# Patient Record
Sex: FEMALE | Race: BLACK OR AFRICAN AMERICAN | Employment: FULL TIME | URBAN - METROPOLITAN AREA
[De-identification: names, ages, dates, MRNs, and addresses within clinical notes are randomized per-mention and may not be internally consistent; named-entity substitution may affect disease eponyms.]

---

## 2018-08-15 ENCOUNTER — HOSPITAL ENCOUNTER (EMERGENCY)
Facility: CLINIC | Age: 38
Discharge: HOME | End: 2018-08-15
Attending: EMERGENCY MEDICINE | Admitting: EMERGENCY MEDICINE
Payer: COMMERCIAL

## 2018-08-15 ENCOUNTER — APPOINTMENT (OUTPATIENT)
Dept: ULTRASOUND IMAGING | Facility: CLINIC | Age: 38
End: 2018-08-15
Attending: EMERGENCY MEDICINE
Payer: COMMERCIAL

## 2018-08-15 VITALS
OXYGEN SATURATION: 98 % | RESPIRATION RATE: 16 BRPM | BODY MASS INDEX: 40.48 KG/M2 | DIASTOLIC BLOOD PRESSURE: 98 MMHG | HEART RATE: 85 BPM | WEIGHT: 220 LBS | SYSTOLIC BLOOD PRESSURE: 154 MMHG | HEIGHT: 62 IN | TEMPERATURE: 98 F

## 2018-08-15 DIAGNOSIS — N93.8 DYSFUNCTIONAL UTERINE BLEEDING: ICD-10-CM

## 2018-08-15 LAB
ABO + RH BLD: NORMAL
ABO + RH BLD: NORMAL
B-HCG SERPL-ACNC: <1 IU/L (ref 0–5)
BASOPHILS # BLD AUTO: 0 10E9/L (ref 0–0.2)
BASOPHILS NFR BLD AUTO: 0.4 %
BLOOD BANK CMNT PATIENT-IMP: NORMAL
BLOOD BANK CMNT PATIENT-IMP: NORMAL
DIFFERENTIAL METHOD BLD: NORMAL
EOSINOPHIL # BLD AUTO: 0.1 10E9/L (ref 0–0.7)
EOSINOPHIL NFR BLD AUTO: 1.4 %
ERYTHROCYTE [DISTWIDTH] IN BLOOD BY AUTOMATED COUNT: 12.3 % (ref 10–15)
FETAL CELL SCN BLD QL ROSETTE: NORMAL
HCT VFR BLD AUTO: 42.6 % (ref 35–47)
HGB BLD-MCNC: 14 G/DL (ref 11.7–15.7)
IMM GRANULOCYTES # BLD: 0 10E9/L (ref 0–0.4)
IMM GRANULOCYTES NFR BLD: 0.3 %
LYMPHOCYTES # BLD AUTO: 2.8 10E9/L (ref 0.8–5.3)
LYMPHOCYTES NFR BLD AUTO: 29.8 %
MCH RBC QN AUTO: 30.1 PG (ref 26.5–33)
MCHC RBC AUTO-ENTMCNC: 32.9 G/DL (ref 31.5–36.5)
MCV RBC AUTO: 92 FL (ref 78–100)
MONOCYTES # BLD AUTO: 0.6 10E9/L (ref 0–1.3)
MONOCYTES NFR BLD AUTO: 6 %
NEUTROPHILS # BLD AUTO: 5.9 10E9/L (ref 1.6–8.3)
NEUTROPHILS NFR BLD AUTO: 62.1 %
NRBC # BLD AUTO: 0 10*3/UL
NRBC BLD AUTO-RTO: 0 /100
PLATELET # BLD AUTO: 341 10E9/L (ref 150–450)
RBC # BLD AUTO: 4.65 10E12/L (ref 3.8–5.2)
RH IG VIALS RECOM PATIENT: NORMAL
SPECIMEN SOURCE: NORMAL
WBC # BLD AUTO: 9.5 10E9/L (ref 4–11)
WET PREP SPEC: NORMAL

## 2018-08-15 PROCEDURE — 87210 SMEAR WET MOUNT SALINE/INK: CPT | Performed by: EMERGENCY MEDICINE

## 2018-08-15 PROCEDURE — 76856 US EXAM PELVIC COMPLETE: CPT

## 2018-08-15 PROCEDURE — 25000128 H RX IP 250 OP 636: Performed by: EMERGENCY MEDICINE

## 2018-08-15 PROCEDURE — 86901 BLOOD TYPING SEROLOGIC RH(D): CPT | Performed by: EMERGENCY MEDICINE

## 2018-08-15 PROCEDURE — 96360 HYDRATION IV INFUSION INIT: CPT

## 2018-08-15 PROCEDURE — 85461 HEMOGLOBIN FETAL: CPT | Performed by: EMERGENCY MEDICINE

## 2018-08-15 PROCEDURE — 84702 CHORIONIC GONADOTROPIN TEST: CPT | Performed by: EMERGENCY MEDICINE

## 2018-08-15 PROCEDURE — 99284 EMERGENCY DEPT VISIT MOD MDM: CPT | Mod: 25

## 2018-08-15 PROCEDURE — 86900 BLOOD TYPING SEROLOGIC ABO: CPT | Performed by: EMERGENCY MEDICINE

## 2018-08-15 PROCEDURE — 85025 COMPLETE CBC W/AUTO DIFF WBC: CPT | Performed by: EMERGENCY MEDICINE

## 2018-08-15 PROCEDURE — 96361 HYDRATE IV INFUSION ADD-ON: CPT

## 2018-08-15 RX ADMIN — SODIUM CHLORIDE 1000 ML: 9 INJECTION, SOLUTION INTRAVENOUS at 10:12

## 2018-08-15 ASSESSMENT — ENCOUNTER SYMPTOMS: ABDOMINAL PAIN: 1

## 2018-08-15 NOTE — ED AVS SNAPSHOT
[facility] Emergency Department    [address]    [facility] [address]    Phone:  [phone]    Fax:  [phone]                                       Amparo Johnson   MRN: [id number]    Department:  [facility] Emergency Department   Date of Visit:  8/15/2018           After Visit Summary Signature Page     I have received my discharge instructions, and my questions have been answered. I have discussed any challenges I see with this plan with the nurse or doctor.    ..........................................................................................................................................  Patient/Patient Representative Signature      ..........................................................................................................................................  Patient Representative Print Name and Relationship to Patient    ..................................................               ................................................  Date                                            Time    ..........................................................................................................................................  Reviewed by Signature/Title    ...................................................              ..............................................  Date                                                            Time

## 2018-08-15 NOTE — DISCHARGE INSTRUCTIONS
Dysfunctional Uterine Bleeding    Dysfunctional uterine bleeding, also called abnormal uterine bleeding, is a condition in which bleeding is abnormal and occurs at unexpected times of the month. This happens because of changes in the hormones that help control a woman s menstrual cycle each month.  The bleeding may be heavier or lighter than normal. If you have heavy bleeding often, this can lead to a problem called anemia. With anemia, your red blood cell count is too low. Red blood cells help carry oxygen throughout your body. Severe anemia may cause you to look pale and feel very weak or tired. You might also become short of breath easily.  To treat dysfunctional uterine bleeding, medicines are often tried first. If these don t help, or if you have additional symptoms or have reached menopause, further testing and treatments may be needed. Discuss all of your options with your provider.  Home care  Medicines  If you re prescribed medicines, be sure to take them as directed. Some of the more common medicines you may be prescribed include:    Hormone therapy (Options include most methods of hormonal birth control such as pills, shots, or a hormone-releasing IUD)    Nonsteroidal anti-inflammatory drugs (NSAIDs), such as ibuprofen    Iron supplements, if you have anemia     General care    Get plenty of rest if you tire easily. Avoid heavy exertion.    To help relieve pain or cramping that may occur with bleeding, try using a heating pad on the lower belly or back. A warm bath may also help.  Follow-up care  Follow up with your healthcare provider, or as directed.  When to seek medical advice  Call your healthcare provider right away if:    Bleeding becomes heavy (soaking 1 pad or tampon every hour for 3 hours)    Increased abdominal pain    Irregular bleeding worsens or does not get better even with treatment    Fever of 100.4 F (38 C) or higher, or as directed by your provider    Signs of anemia, such as pale  skin, extreme fatigue or weakness, or shortness of breath    Dizziness or fainting   Date Last Reviewed: 11/1/2017 2000-2017 [employer]. [address] [place] [address], [place] [address]. All rights reserved. This information is not intended as a substitute for professional medical care. Always follow your healthcare professional's instructions.

## 2018-08-15 NOTE — ED TRIAGE NOTES
Pt states had vaginal bleeding last night with large clots and abdominal pain last night. Pt thought maybe she was getting her period as it is irregular and last period was over a month ago. Pt states bleeding and pain subsided until this morning. Pt states bleeding became heavy again this morning. Pt ABCs intrat and A&Ox4.

## 2018-08-15 NOTE — ED PROVIDER NOTES
"  History     Chief Complaint:  Vaginal Bleeding    HPI   Amparo Johnson is a 38 year old female who presents to the emergency department today for evaluation of vaginal bleeding and abdominal pain.  The patient reports that yesterday, 08/14/18, she had abdominal pain. She initially attributed this pain to her period, but notes she did not have her period for a month. The patient then states she took a shower and \"huge\" blood clots came out an she had sharp abdominal pain. She states she was going through 2 pads per hour. She states she has a history of heavy periods, but is not currently taking medication. The patient denies any recent positive pregnancy test.     Allergies:  No Known Allergies     Medications:    Baclofen  Calcium carbonate    Past Medical History:    Cardiovascular screening  Hypertension  Multiple sclerosis    Past Surgical History:   History reviewed.  No pertinent past surgical history.     Family History:    Father: diabetes, hypetension    Social History:  The patient was accompanied to the ED by her significant other.  Smoking Status: Former Smoker   Packs/day: 1   Years:15   Type: cigarettes   Quit date: 01/17/2013  Smokeless Tobacco: Never Used  Alcohol Use: Positive   Marital Status:  Single      Review of Systems   Gastrointestinal: Positive for abdominal pain.   Genitourinary: Positive for vaginal bleeding.   All other systems reviewed and are negative.    Physical Exam     Patient Vitals for the past 24 hrs:   BP Temp Temp src Pulse Resp SpO2 Height Weight   08/15/18 1324 (!) 154/98 - - - 16 98 % - -   08/15/18 1323 - - - - - 100 % - -   08/15/18 1143 - - - - - 100 % - -   08/15/18 1100 (!) 141/94 - - - - 98 % - -   08/15/18 1045 - - - 85 - 100 % - -   08/15/18 1039 - - - - - 100 % - -   08/15/18 1038 (!) 145/97 - - - - - - -   08/15/18 0952 - 98  F (36.7  C) Oral 89 16 100 % 1.575 m (5' 2\") 99.8 kg (220 lb)   08/15/18 0948 (!) 176/105 - - - - - - -      Physical Exam "   Constitutional: She appears well-developed and well-nourished.   HENT:   Right Ear: External ear normal.   Left Ear: External ear normal.   Mouth/Throat: Oropharynx is clear and moist. No oropharyngeal exudate.   Eyes: Conjunctivae are normal. Pupils are equal, round, and reactive to light. No scleral icterus.   Neck: Normal range of motion. Neck supple.   Cardiovascular: Normal rate, regular rhythm, normal heart sounds and intact distal pulses.  Exam reveals no gallop and no friction rub.    No murmur heard.  Pulmonary/Chest: Effort normal and breath sounds normal. No respiratory distress. She has no wheezes. She has no rales.   Abdominal: Soft. Bowel sounds are normal. She exhibits no distension and no mass. There is no tenderness.   Genitourinary:   Genitourinary Comments: Some bleeding, no abnormalities. No CMT, no uterine TTP   Musculoskeletal: She exhibits no edema.   Neurological: She is alert.   Skin: Skin is warm and dry. No rash noted.   Psychiatric: She has a normal mood and affect.     Emergency Department Course     Imaging:  Radiology findings were communicated with the patient who voiced understanding of the findings.    US Pelvic Complete with Transvaginal  1. Endometrial stripe is approximately 1 cm in diameter. There is  echogenic material in the endometrial canal in the lower uterus. This  could be blood or echogenic endometrial tissue. Recommend followup  ultrasound in one to two menstrual cycles.  2. Otherwise unremarkable.  Reading per radiology    Laboratory:  Laboratory findings were communicated with the patient who voiced understanding of the findings.    CBC: WBC 9.5, HGB 14,   HCG Quantitative: <1  Rh immune globulin study: ABO A, RH(D) pos    Wet prep: AWNL    Interventions:  1012 NS bolus 1000 mL IV    Emergency Department Course:    0945 Nursing notes and vitals reviewed.    0950 I performed an exam of the patient as documented above.     1016 IV was inserted and blood was  drawn for laboratory testing, results above.     1112 The patient was sent for a Pelvic US while in the emergency department, results above.      1145 I performed a pelvic exam of the patient as documented above it the presence of a female  chaperone.      1158 A vaginal swab was obtained for laboratory testing as documented above.     1310 Recheck and update. Patient is requesting dilation and curettage     1322 I spoke with Dr. Mckenzie of the OB service from regarding patient's presentation, findings, and plan  of care.     1326 I personally reviewed the laboratory and imaging results with the patient and answered all  related questions prior to discharge.    Impression & Plan      Medical Decision Making:  Amparo Johnson is a 38 year old female who presents with possible pregnancy and vaginal bleeding that started yesterday. The patient otherwise did not have any sever pain on exam. Hemoglobin is stable at 14. She does have a history of anemia as a teenager, but not currently. HCG is less than 1, US showed a small anterior fibroid and mild thickened endometrial stripe this also echogenic area which could be endometria tissue versus blood. The patient is otherwise comfortable at this point. I did refer to Dr. Dukes's office. Case was discussed with Dr. Mckenzie, the on call physician.     Diagnosis:    ICD-10-CM    1. Dysfunctional uterine bleeding N93.8      Disposition:   The patient is discharged to home.     Discharge Medications:  No discharge medications    Scribe Disclosure:  I, Camelia Martínez, am serving as a scribe at 9:45 AM on 8/15/2018 to document services personally performed by Norm Chung MD based on my observations and the provider's statements to me.     [facility] EMERGENCY DEPARTMENT       Norm Cuhng MD  08/15/18 6713

## 2018-08-15 NOTE — ED AVS SNAPSHOT
[facility] Emergency Department    [address] [place] [address]    Phone:  [phone]    Fax:  [phone]                                       Amparo Johnson   MRN: [id number]    Department:  [facility] Emergency Department   Date of Visit:  8/15/2018           Patient Information     Date Of Birth          1980        Your diagnoses for this visit were:     Dysfunctional uterine bleeding        You were seen by Norm Chung MD.      Follow-up Information     Follow up with Linda Joseph MD. Go in 2 days.    Specialty:  OB/Gyn    Contact information:    [facility]  [address]  [facility] [address]  [phone]          Discharge Instructions         Dysfunctional Uterine Bleeding    Dysfunctional uterine bleeding, also called abnormal uterine bleeding, is a condition in which bleeding is abnormal and occurs at unexpected times of the month. This happens because of changes in the hormones that help control a woman s menstrual cycle each month.  The bleeding may be heavier or lighter than normal. If you have heavy bleeding often, this can lead to a problem called anemia. With anemia, your red blood cell count is too low. Red blood cells help carry oxygen throughout your body. Severe anemia may cause you to look pale and feel very weak or tired. You might also become short of breath easily.  To treat dysfunctional uterine bleeding, medicines are often tried first. If these don t help, or if you have additional symptoms or have reached menopause, further testing and treatments may be needed. Discuss all of your options with your provider.  Home care  Medicines  If you re prescribed medicines, be sure to take them as directed. Some of the more common medicines you may be prescribed include:    Hormone therapy (Options include most methods of hormonal birth control such as pills, shots, or a  hormone-releasing IUD)    Nonsteroidal anti-inflammatory drugs (NSAIDs), such as ibuprofen    Iron supplements, if you have anemia     General care    Get plenty of rest if you tire easily. Avoid heavy exertion.    To help relieve pain or cramping that may occur with bleeding, try using a heating pad on the lower belly or back. A warm bath may also help.  Follow-up care  Follow up with your healthcare provider, or as directed.  When to seek medical advice  Call your healthcare provider right away if:    Bleeding becomes heavy (soaking 1 pad or tampon every hour for 3 hours)    Increased abdominal pain    Irregular bleeding worsens or does not get better even with treatment    Fever of 100.4 F (38 C) or higher, or as directed by your provider    Signs of anemia, such as pale skin, extreme fatigue or weakness, or shortness of breath    Dizziness or fainting   Date Last Reviewed: 11/1/2017 2000-2017 [employer]. [address] [place] [address]. All rights reserved. This information is not intended as a substitute for professional medical care. Always follow your healthcare professional's instructions.          24 Hour Appointment Hotline       To make an appointment at any [facility], call [phone] ([phone]). If you don't have a family doctor or clinic, we will help you find one. [facility] clinics are conveniently located to serve the needs of you and your family.             Review of your medicines      Our records show that you are taking the medicines listed below. If these are incorrect, please call your family doctor or clinic.        Dose / Directions Last dose taken    BACLOFEN PO        Refills:  0        calcium carbonate 750 MG Chew   Dose:  750 mg        Take 750 mg by mouth daily as needed   Refills:  0                Procedures and tests performed during your visit     CBC with platelets differential    HCG QUANTitative pregnancy (blood)    Rh Immune Globulin  Study    Rho (D) immune globulin (RhoGam) Lab Study    US Pelvic Complete with Transvaginal    Wet prep      Orders Needing Specimen Collection     None      Pending Results     Date and Time Order Name Status Description    8/15/2018 1053 US Pelvic Complete with Transvaginal Preliminary             Pending Culture Results     No orders found from 8/13/2018 to 8/16/2018.            Pending Results Instructions     If you had any lab results that were not finalized at the time of your Discharge, you can call the ED Lab Result RN at [phone]. You will be contacted by this team for any positive Lab results or changes in treatment. The nurses are available 7 days a week from 10A to 6:30P.  You can leave a message 24 hours per day and they will return your call.        Test Results From Your Hospital Stay        8/15/2018 12:26 PM      Component Results     Component    Specimen Description    Vagina    Wet Prep    No Trichomonas seen    Wet Prep    No yeast seen    Wet Prep    Rare  PMNs seen      Wet Prep    No clue cells seen         8/15/2018 10:23 AM      Component Results     Component Value Ref Range & Units Status    WBC 9.5 4.0 - 11.0 10e9/L Final    RBC Count 4.65 3.8 - 5.2 10e12/L Final    Hemoglobin 14.0 11.7 - 15.7 g/dL Final    Hematocrit 42.6 35.0 - 47.0 % Final    MCV 92 78 - 100 fl Final    MCH 30.1 26.5 - 33.0 pg Final    MCHC 32.9 31.5 - 36.5 g/dL Final    RDW 12.3 10.0 - 15.0 % Final    Platelet Count 341 150 - 450 10e9/L Final    Diff Method Automated Method  Final    % Neutrophils 62.1 % Final    % Lymphocytes 29.8 % Final    % Monocytes 6.0 % Final    % Eosinophils 1.4 % Final    % Basophils 0.4 % Final    % Immature Granulocytes 0.3 % Final    Nucleated RBCs 0 0 /100 Final    Absolute Neutrophil 5.9 1.6 - 8.3 10e9/L Final    Absolute Lymphocytes 2.8 0.8 - 5.3 10e9/L Final    Absolute Monocytes 0.6 0.0 - 1.3 10e9/L Final    Absolute Eosinophils 0.1 0.0 - 0.7 10e9/L Final    Absolute Basophils  0.0 0.0 - 0.2 10e9/L Final    Abs Immature Granulocytes 0.0 0 - 0.4 10e9/L Final    Absolute Nucleated RBC 0.0  Final         8/15/2018  9:58 AM      Component Results     Component    Rhogam Order    Order received    Comment:    See Rhogam Study/Suitability               8/15/2018 10:40 AM      Component Results     Component Value Ref Range & Units Status    HCG Quantitative Serum <1 0 - 5 IU/L Final         8/15/2018 12:06 PM      Component Results     Component    ABO    A    RH(D)    Pos    Fetal Blood Screen    Canceled, Test credited    Blood Bank Comment    Not suitable for Rh Immune Globulin  Patient is Rh positive      Amount of RHIG Required    Not suitable for Rh Immune Globulin         8/15/2018 12:00 PM      Narrative     ULTRASOUND PELVIC COMPLETE WITH TRANSVAGINAL IMAGING  8/15/2018 11:43  AM     HISTORY:  Vaginal bleeding.     TECHNIQUE:  Transvaginal images were performed to better evaluate the  patient's uterus, ovaries and endometrial stripe.    COMPARISON: None.    FINDINGS: The uterus measures 9.7 x 5.8 x 3.7 cm. There is a small 1.0  cm anterior fundal fibroid. Endometrial stripe measures approximately  1.0 cm. There is an echogenic area in the lower uterine segment that  measures 2.8 x 1.4 x 1.1 cm and may be echogenic endometrial tissue.  Recommend followup ultrasound in one to two menstrual cycles if there  is no intervention.    The left ovary is normal measuring 2.8 x 1.7 x 2.1 cm. Right ovary  also appears normal measuring 3.3 x 1.3 x 2.8 cm.        Impression     IMPRESSION:   1. Endometrial stripe is approximately 1 cm in diameter. There is  echogenic material in the endometrial canal in the lower uterus. This  could be blood or echogenic endometrial tissue. Recommend followup  ultrasound in one to two menstrual cycles.  2. Otherwise unremarkable.                Clinical Quality Measure: Blood Pressure Screening     Your blood pressure was checked while you were in the emergency  "department today. The last reading we obtained was  BP: (!) 141/94 . Please read the guidelines below about what these numbers mean and what you should do about them.  If your systolic blood pressure (the top number) is less than 120 and your diastolic blood pressure (the bottom number) is less than 80, then your blood pressure is normal. There is nothing more that you need to do about it.  If your systolic blood pressure (the top number) is 120-139 or your diastolic blood pressure (the bottom number) is 80-89, your blood pressure may be higher than it should be. You should have your blood pressure rechecked within a year by a primary care provider.  If your systolic blood pressure (the top number) is 140 or greater or your diastolic blood pressure (the bottom number) is 90 or greater, you may have high blood pressure. High blood pressure is treatable, but if left untreated over time it can put you at risk for heart attack, stroke, or kidney failure. You should have your blood pressure rechecked by a primary care provider within the next 4 weeks.  If your provider in the emergency department today gave you specific instructions to follow-up with your doctor or provider even sooner than that, you should follow that instruction and not wait for up to 4 weeks for your follow-up visit.        Thank you for choosing [facility]       Thank you for choosing [facility] for your care. Our goal is always to provide you with excellent care. Hearing back from our patients is one way we can continue to improve our services. Please take a few minutes to complete the written survey that you may receive in the mail after you visit with us. Thank you!        [employer] Information     [employer] lets you send messages to your doctor, view your test results, renew your prescriptions, schedule appointments and more. To sign up, go to [email or web address] . Click on \"Log in\" on the left side of the screen, which will take you to the Welcome " "page. Then click on \"Sign up Now\" on the right side of the page.     You will be asked to enter the access code listed below, as well as some personal information. Please follow the directions to create your username and password.     Your access code is: [email or web address]  Expires: 2018  1:16 PM     Your access code will  in 90 days. If you need help or a new code, please call your [facility] clinic or [phone].        Care EveryWhere ID     This is your Care EveryWhere ID. This could be used by other organizations to access your [facility] medical records  [id number]        Equal Access to Services     [facility] : David Lawton, jude martel, stephanie fisher, ivette manzanares. So [facility] [place] [phone].    ATENCIÓN: Si habla español, tiene a herrmann disposición servicios gratuitos de asistencia lingüística. Llame al [phone].    We comply with applicable federal civil rights laws and [place] laws. We do not discriminate on the basis of race, color, national origin, age, disability, sex, sexual orientation, or gender identity.            After Visit Summary       This is your record. Keep this with you and show to your community pharmacist(s) and doctor(s) at your next visit.                  "

## 2020-06-28 ENCOUNTER — HOSPITAL ENCOUNTER (EMERGENCY)
Facility: CLINIC | Age: 40
Discharge: HOME | End: 2020-06-28
Attending: PHYSICIAN ASSISTANT | Admitting: PHYSICIAN ASSISTANT

## 2020-06-28 VITALS — SYSTOLIC BLOOD PRESSURE: 159 MMHG | DIASTOLIC BLOOD PRESSURE: 92 MMHG | HEART RATE: 74 BPM | OXYGEN SATURATION: 100 %

## 2020-06-28 DIAGNOSIS — T67.9XXA HEAT EXPOSURE, INITIAL ENCOUNTER: ICD-10-CM

## 2020-06-28 LAB
ANION GAP SERPL CALCULATED.3IONS-SCNC: 6 MMOL/L (ref 3–14)
BASOPHILS # BLD AUTO: 0 10E9/L (ref 0–0.2)
BASOPHILS NFR BLD AUTO: 0.1 %
BUN SERPL-MCNC: 9 MG/DL (ref 7–30)
CALCIUM SERPL-MCNC: 9.2 MG/DL (ref 8.5–10.1)
CHLORIDE SERPL-SCNC: 101 MMOL/L (ref 94–109)
CO2 SERPL-SCNC: 28 MMOL/L (ref 20–32)
CREAT SERPL-MCNC: 1.02 MG/DL (ref 0.52–1.04)
DIFFERENTIAL METHOD BLD: NORMAL
EOSINOPHIL # BLD AUTO: 0 10E9/L (ref 0–0.7)
EOSINOPHIL NFR BLD AUTO: 0.4 %
ERYTHROCYTE [DISTWIDTH] IN BLOOD BY AUTOMATED COUNT: 12.5 % (ref 10–15)
GFR SERPL CREATININE-BSD FRML MDRD: 69 ML/MIN/{1.73_M2}
GLUCOSE SERPL-MCNC: 122 MG/DL (ref 70–99)
HCT VFR BLD AUTO: 41.3 % (ref 35–47)
HGB BLD-MCNC: 13.7 G/DL (ref 11.7–15.7)
IMM GRANULOCYTES # BLD: 0 10E9/L (ref 0–0.4)
IMM GRANULOCYTES NFR BLD: 0.1 %
LYMPHOCYTES # BLD AUTO: 2.3 10E9/L (ref 0.8–5.3)
LYMPHOCYTES NFR BLD AUTO: 24.5 %
MCH RBC QN AUTO: 29.8 PG (ref 26.5–33)
MCHC RBC AUTO-ENTMCNC: 33.2 G/DL (ref 31.5–36.5)
MCV RBC AUTO: 90 FL (ref 78–100)
MONOCYTES # BLD AUTO: 0.4 10E9/L (ref 0–1.3)
MONOCYTES NFR BLD AUTO: 4.4 %
NEUTROPHILS # BLD AUTO: 6.5 10E9/L (ref 1.6–8.3)
NEUTROPHILS NFR BLD AUTO: 70.5 %
NRBC # BLD AUTO: 0 10*3/UL
NRBC BLD AUTO-RTO: 0 /100
PLATELET # BLD AUTO: 311 10E9/L (ref 150–450)
POTASSIUM SERPL-SCNC: 3.6 MMOL/L (ref 3.4–5.3)
RBC # BLD AUTO: 4.6 10E12/L (ref 3.8–5.2)
SODIUM SERPL-SCNC: 135 MMOL/L (ref 133–144)
WBC # BLD AUTO: 9.2 10E9/L (ref 4–11)

## 2020-06-28 PROCEDURE — 96360 HYDRATION IV INFUSION INIT: CPT

## 2020-06-28 PROCEDURE — 85025 COMPLETE CBC W/AUTO DIFF WBC: CPT | Performed by: PHYSICIAN ASSISTANT

## 2020-06-28 PROCEDURE — 25800030 ZZH RX IP 258 OP 636: Performed by: PHYSICIAN ASSISTANT

## 2020-06-28 PROCEDURE — 80048 BASIC METABOLIC PNL TOTAL CA: CPT | Performed by: PHYSICIAN ASSISTANT

## 2020-06-28 PROCEDURE — 99284 EMERGENCY DEPT VISIT MOD MDM: CPT | Mod: 25

## 2020-06-28 PROCEDURE — 93005 ELECTROCARDIOGRAM TRACING: CPT

## 2020-06-28 RX ADMIN — SODIUM CHLORIDE 1000 ML: 9 INJECTION, SOLUTION INTRAVENOUS at 15:52

## 2020-06-28 ASSESSMENT — ENCOUNTER SYMPTOMS
LIGHT-HEADEDNESS: 1
VOMITING: 1
SHORTNESS OF BREATH: 1
PALPITATIONS: 0
NAUSEA: 1

## 2020-06-28 NOTE — ED TRIAGE NOTES
Biking and got hot and dizzy; she stopped and vomitted x2; EMS gave fluids and zofran; she started to feel better in the AC

## 2020-06-28 NOTE — ED AVS SNAPSHOT
Emergency Department  [address] [facility] [address]  Phone:  [phone]  Fax:  [phone]                                    Amparo Johnson   MRN: [id number]    Department:   Emergency Department   Date of Visit:  6/28/2020           After Visit Summary Signature Page    I have received my discharge instructions, and my questions have been answered. I have discussed any challenges I see with this plan with the nurse or doctor.    ..........................................................................................................................................  Patient/Patient Representative Signature      ..........................................................................................................................................  Patient Representative Print Name and Relationship to Patient    ..................................................               ................................................  Date                                   Time    ..........................................................................................................................................  Reviewed by Signature/Title    ...................................................              ..............................................  Date                                               Time          22EPIC Rev 08/18

## 2020-06-28 NOTE — DISCHARGE INSTRUCTIONS
Rest, drink plenty of fluids, and stay out of the heat for the rest the day.  Follow-up with primary care provider in 2-3 days if any concerns.    Return to the emergency department for chest pain, shortness of breath, faint, or feel like you are going to faint, or any other concerning symptoms.

## 2021-08-29 ENCOUNTER — HOSPITAL ENCOUNTER (EMERGENCY)
Facility: CLINIC | Age: 41
Discharge: HOME | End: 2021-08-29
Attending: EMERGENCY MEDICINE | Admitting: EMERGENCY MEDICINE

## 2021-08-29 VITALS
TEMPERATURE: 98.1 F | DIASTOLIC BLOOD PRESSURE: 91 MMHG | SYSTOLIC BLOOD PRESSURE: 154 MMHG | HEART RATE: 91 BPM | OXYGEN SATURATION: 97 % | RESPIRATION RATE: 20 BRPM

## 2021-08-29 DIAGNOSIS — G57.31 NEUROPATHY OF RIGHT COMMON PERONEAL NERVE AT HEAD OF FIBULA: ICD-10-CM

## 2021-08-29 PROCEDURE — 99282 EMERGENCY DEPT VISIT SF MDM: CPT

## 2021-08-29 ASSESSMENT — ENCOUNTER SYMPTOMS: NUMBNESS: 1

## 2021-08-29 NOTE — ED PROVIDER NOTES
History     Chief Complaint:  Back Pain     HPI   Amparo Johnson is a 41 year old female with history of multiple sclerosis, not manages by medication, who presents with right knee to toe numbness, tingling, and stiffness while she was laying on her stomach looking at her phone. She states that she tried stretching this area but noticed that she could not feel her toes. This has improved markedly since arrival here.     Review of Systems   Neurological: Positive for numbness.   All other systems reviewed and are negative.    Allergies:  The patient has no known allergies.     Medications:  The patient is not currently taking any prescribed medications.    Past Medical History:    Hypertension  Multiple sclerosis  Obesity  ASCUS    Family History:    Father: Diabetes, hypertension  Mother: Hypertension    Social History:  Patient presents to the ED with friend.    Physical Exam     Patient Vitals for the past 24 hrs:   BP Temp Temp src Pulse Resp SpO2   08/29/21 0111 154/91 98.1  F (36.7  C) Oral 91 20 97 %     Physical Exam  Nursing note and vitals reviewed.  Constitutional: Cooperative.   Cardiovascular: Normal rate, regular rhythm and normal heart sounds.    Pulmonary/Chest: Effort normal and breath sounds normal. No respiratory distress.   Abdominal: Normal appearance  Musculoskeletal: Normal range of motion of LE's, no edema.   Neurological: Alert. Normal strength and sensation in lower extremities. 2+ patellar reflexes.  Skin: Skin is warm and dry. No rash noted.   Psychiatric: Normal mood and affect.     Emergency Department Course     Reviewed:  I reviewed nursing notes, vitals, past medical history and care everywhere    Assessments:  0138 I obtained history and examined the patient as noted above.     Disposition:  The patient was discharged to home.     Impression & Plan     Medical Decision Making:  Amparo Johnson is a 41 year old female with history of MS, not on medication, who presents with  transient right lower extremity numbness. This is consistent with compression of her peroneal nerve at the fibular head. She did have numbness distributed over the lateral aspect of her leg going into her toes that got better after she stood up. She showed me the position that she laid in which was holding her entire body weight over the fibular head of the right. We have talked about ergonomic position. This does not fit with a central neurologic system lesion rather is a classic story for peripheral nerve compression and so she will be discharged home.     Diagnosis:    ICD-10-CM    1. 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              Fabien Rivera MD  08/29/21 0357

## 2021-08-29 NOTE — ED TRIAGE NOTES
Pt states back pain for one week with paresthesia in RLE tonight. Pt states hx of MS but has not been taking medication for past 2 years due to financial difficulties. ABCs intact GCS 15

## 2022-05-29 NOTE — ED PROVIDER NOTES
"  History     Chief Complaint:  Heat Exposure      The history is provided by the patient.      Amparo Johnson is a 40 year old female who presents with heat exposure. The patient was biking for 30-40 minutes when she became dizzy and overheated. She had one episode of emesis. On EMS arrival she had some difficulty breathing but felt improved with fluids and zofran. At the time of interview her symptoms have resolved, stating she feels \"fine.\" Amparo denies chest pain or palpitations.     Allergies:  No Known Allergies     Medications:    Baclofen   Calcium carbonate     Past Medical History:    Multiple sclerosis   Hypertension   Vitamin D     Past Surgical History:    The patient does not have any pertinent past surgical history.     Family History:    Father: diabetes, hypertension     Social History:  Smoking Status: Former smoker   Smokeless Tobacco: Never Used  Alcohol Use: Yes  Drug Use: No  PCP: Ashely Reyes  Marital Status:  Single [1]      Review of Systems   Respiratory: Positive for shortness of breath.    Cardiovascular: Negative for chest pain and palpitations.   Gastrointestinal: Positive for nausea and vomiting.   Neurological: Positive for light-headedness.   All other systems reviewed and are negative.      Physical Exam     Patient Vitals for the past 24 hrs:   BP Pulse SpO2   06/28/20 1550 -- -- 98 %   06/28/20 1520 (!) 140/77 79 --        Physical Exam  General: Alert, interactive. No distress.   Head:  Scalp is atraumatic.  Eyes:  EOM intact. The pupils are equal, round, and reactive to light. No scleral icterus.   ENT:                                      Ears:  The external ears are normal.   Nose:  The external nose is normal.  Throat:  The oropharynx is normal. Mucus membranes are moist.                 Neck:  Normal range of motion. There is no rigidity.   CV:  Regular rate and rhythm. No murmur. 2+ radial pulses  Resp:  Breath sounds are clear bilaterally. Non-labored, " no retractions or accessory muscle use.  GI:  Abdomen is soft, no distension, no tenderness.   MS:  Normal range of motion.   Skin:  Warm and dry.   Neuro:  Strength and sensation grossly intact.   Psych:  Awake. Alert.  Appropriate interactions.     Emergency Department Course   ECG:  ECG taken at 1558, ECG read at 1600  Normal sinus rhythm  Normal ECG  No significant change when compared to EKG dated 02/12/15.  Rate 80 bpm. NC interval 156 ms. QRS duration 88 ms. QT/QTc 378/435 ms. P-R-T axes 22 25 13.    Laboratory:  Laboratory findings were communicated with the patient who voiced understanding of the findings.    CBC: WBC 9.2, HGB 13.7,   BMP: glucose 122 (H) o/w WNL (Creatinine 1.02)    Interventions:  1552 NS bolus 1,000 ml IV     Emergency Department Course:  Nursing notes and vitals reviewed.    3:46 PM  I performed an exam of the patient as documented above.     IV was inserted and blood was drawn for laboratory testing, results above.    4:53 PM  Amparo was able to ambulate safely in the Emergency Department, she is comfortable with discharge.      Findings and plan explained to the Patient. Patient discharged home with instructions regarding supportive care, medications, and reasons to return. The importance of close follow-up was reviewed.      Impression & Plan    Medical Decision Making:  Amparo Johnson is a 40 year old female who presents to the emergency department today for evaluation after an episode of dizziness and overheating while biking.  She states she was biking for 40 minutes and had an episode of dizziness and felt like she overheated.  Vitals normal on arrival.  No electrolyte abnormalities.  No altered mental status to suspect heatstroke.  Patient had no chest pain and symptoms quickly resolved after she stopped biking, I doubt cardiopulmonary emergency including pulmonary embolism, acute coronary syndrome, among others.  Patient remained hemodynamically stable.  It  is very hot outside and I suspect heat exhaustion.  Patient ambulated without difficulty and appropriate for discharge home.  All questions and concerns addressed prior to discharge home.    Diagnosis:    ICD-10-CM    1. Heat exposure, initial encounter  T67.9XXA        Disposition:   The patient is discharged to home.     Scribe Disclosure:  I, Evelina Ramirez, am serving as a scribe at 3:23 PM on 6/28/2020 to document services personally performed by Katie Arevalo PA-C based on my observations and the provider's statements to me.        EMERGENCY DEPARTMENT       Katie Arevalo PA-C  06/28/20 4713     Fall with Harm Risk